# Patient Record
Sex: MALE | URBAN - METROPOLITAN AREA
[De-identification: names, ages, dates, MRNs, and addresses within clinical notes are randomized per-mention and may not be internally consistent; named-entity substitution may affect disease eponyms.]

---

## 2020-04-30 ENCOUNTER — APPOINTMENT (RX ONLY)
Dept: URBAN - METROPOLITAN AREA CLINIC 158 | Facility: CLINIC | Age: 50
Setting detail: DERMATOLOGY
End: 2020-04-30

## 2020-04-30 DIAGNOSIS — L52 ERYTHEMA NODOSUM: ICD-10-CM

## 2020-04-30 DIAGNOSIS — B35.3 TINEA PEDIS: ICD-10-CM

## 2020-04-30 DIAGNOSIS — L82.1 OTHER SEBORRHEIC KERATOSIS: ICD-10-CM

## 2020-04-30 DIAGNOSIS — B35.1 TINEA UNGUIUM: ICD-10-CM

## 2020-04-30 PROCEDURE — ? PRESCRIPTION

## 2020-04-30 PROCEDURE — ? ORDER TESTS

## 2020-04-30 PROCEDURE — ? PATIENT SPECIFIC COUNSELING

## 2020-04-30 PROCEDURE — 99202 OFFICE O/P NEW SF 15 MIN: CPT

## 2020-04-30 PROCEDURE — ? COUNSELING

## 2020-04-30 RX ORDER — KETOCONAZOLE 20 MG/G
1 CREAM TOPICAL BID
Qty: 1 | Refills: 11 | Status: ERX | COMMUNITY
Start: 2020-04-30

## 2020-04-30 RX ADMIN — KETOCONAZOLE 1: 20 CREAM TOPICAL at 00:00

## 2020-04-30 ASSESSMENT — LOCATION DETAILED DESCRIPTION DERM
LOCATION DETAILED: RIGHT PLANTAR FOREFOOT OVERLYING 2ND METATARSAL
LOCATION DETAILED: RIGHT LATERAL PLANTAR HEEL
LOCATION DETAILED: LEFT DISTAL PRETIBIAL REGION
LOCATION DETAILED: LEFT LATERAL PLANTAR HEEL
LOCATION DETAILED: RIGHT DISTAL DORSAL FOREARM
LOCATION DETAILED: LEFT DISTAL DORSAL FOREARM
LOCATION DETAILED: RIGHT HEEL
LOCATION DETAILED: LEFT PROXIMAL DORSAL FOREARM
LOCATION DETAILED: RIGHT PROXIMAL DORSAL FOREARM
LOCATION DETAILED: LEFT LATERAL PLANTAR FOOT
LOCATION DETAILED: RIGHT GREAT TOENAIL
LOCATION DETAILED: RIGHT LATERAL HEEL
LOCATION DETAILED: LEFT GREAT TOENAIL
LOCATION DETAILED: RIGHT LATERAL PLANTAR FOOT
LOCATION DETAILED: LEFT HEEL
LOCATION DETAILED: LEFT PROXIMAL PRETIBIAL REGION
LOCATION DETAILED: LEFT LATERAL HEEL
LOCATION DETAILED: LEFT PLANTAR FOREFOOT OVERLYING 2ND METATARSAL
LOCATION DETAILED: RIGHT MEDIAL DISTAL PRETIBIAL REGION

## 2020-04-30 ASSESSMENT — LOCATION ZONE DERM
LOCATION ZONE: TOENAIL
LOCATION ZONE: ARM
LOCATION ZONE: FEET
LOCATION ZONE: LEG

## 2020-04-30 ASSESSMENT — LOCATION SIMPLE DESCRIPTION DERM
LOCATION SIMPLE: RIGHT GREAT TOE
LOCATION SIMPLE: LEFT PRETIBIAL REGION
LOCATION SIMPLE: LEFT PLANTAR SURFACE
LOCATION SIMPLE: RIGHT HEEL
LOCATION SIMPLE: RIGHT FOOT
LOCATION SIMPLE: RIGHT PRETIBIAL REGION
LOCATION SIMPLE: LEFT FOREARM
LOCATION SIMPLE: RIGHT PLANTAR SURFACE
LOCATION SIMPLE: LEFT GREAT TOE
LOCATION SIMPLE: LEFT HEEL
LOCATION SIMPLE: LEFT FOOT
LOCATION SIMPLE: RIGHT FOREARM

## 2020-04-30 ASSESSMENT — SEVERITY ASSESSMENT: SEVERITY: MILD

## 2021-09-09 NOTE — PROCEDURE: PATIENT SPECIFIC COUNSELING
with patient
Patient reports a long-standing history (>1 year) of recurrent tender nodules over the arms and legs that comes and goes.  He had seen another dermatologist in the past but was never told of his diagnosis.  He was only prescribed triamcinolone cream which has not help.  Exam showed few tender subcutaneous nodules on the legs.  Forearm is clear today.  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  Differential diagnosis include rheumatoid nodules or other panniculitis. I discussed with the patient that EN is a hypersensitivity reaction within the subcutaneous fat layer of the skin.  Causes of EN can include infection (such as tinea, strep, TB, histo, coccidio), medications, IBD, sarcoidosis, malignancy, pregnancy, CTDs, and idiopathic (>50% of cases).  Possible causes for this patient would include tinea pedis/onychomycosis and his underlying rheumatologic condition (?RA).  Patient could not tell me his exact rheumatologic diagnosis other then possible fibromylagia.  Per the medical record sent over from Dr. Williamson, RA and polyarthropathy are both listed in his problem list.  He states that he was just seen by his rheumatlogist (Dr. Jaleel Ramos) a few days ago and he states that blood test showed an elevated CRP.  He was just started on hydroxychloroquine 200mg BID.  I recommend treatment of his tinea/onychomycosis with ketoconazole cream and oral terbinafine.  Given his past history of liver issue with hepatitis B and possible interaction of terbinafine with metoprolol, I will start him on terbinafine at 250mg, 2 tablets per week (one tablet QD on weekends) for 6 months instead of the standard 250mg QD for 90 days.  I will have him get LFTs in 2-3 weeks. Hydroxychloroquine is also reported a treatment for EN. Return to clinic in 2 months.
Detail Level: Detailed